# Patient Record
Sex: FEMALE | Race: WHITE
[De-identification: names, ages, dates, MRNs, and addresses within clinical notes are randomized per-mention and may not be internally consistent; named-entity substitution may affect disease eponyms.]

---

## 2018-06-29 ENCOUNTER — HOSPITAL ENCOUNTER (INPATIENT)
Dept: HOSPITAL 96 - M.SUR | Age: 73
LOS: 2 days | Discharge: HOME | DRG: 563 | End: 2018-07-01
Attending: INTERNAL MEDICINE | Admitting: INTERNAL MEDICINE
Payer: COMMERCIAL

## 2018-06-29 VITALS — SYSTOLIC BLOOD PRESSURE: 139 MMHG | DIASTOLIC BLOOD PRESSURE: 70 MMHG

## 2018-06-29 VITALS — BODY MASS INDEX: 34.07 KG/M2 | HEIGHT: 59.02 IN | WEIGHT: 169 LBS

## 2018-06-29 VITALS — SYSTOLIC BLOOD PRESSURE: 111 MMHG | DIASTOLIC BLOOD PRESSURE: 60 MMHG

## 2018-06-29 VITALS — DIASTOLIC BLOOD PRESSURE: 64 MMHG | SYSTOLIC BLOOD PRESSURE: 152 MMHG
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
## 2018-06-29 DIAGNOSIS — Z79.899: ICD-10-CM

## 2018-06-29 DIAGNOSIS — K21.9: ICD-10-CM
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
## 2018-06-29 DIAGNOSIS — I10: ICD-10-CM
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
## 2018-06-29 DIAGNOSIS — S42.252A: Primary | ICD-10-CM
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
## 2018-06-29 DIAGNOSIS — E66.01: ICD-10-CM

## 2018-06-29 DIAGNOSIS — F41.9: ICD-10-CM

## 2018-06-29 LAB
ABSOLUTE BASOPHILS: 0.1 THOU/UL (ref 0–0.2)
ABSOLUTE EOSINOPHILS: 0 THOU/UL (ref 0–0.7)
ABSOLUTE MONOCYTES: 0.4 THOU/UL (ref 0–1.2)
ALBUMIN SERPL-MCNC: 3.8 G/DL (ref 3.4–5)
ALP SERPL-CCNC: 56 U/L (ref 46–116)
ALT SERPL-CCNC: 23 U/L (ref 30–65)
ANION GAP SERPL CALC-SCNC: 6 MMOL/L (ref 7–16)
APTT BLD: 25.5 SECONDS (ref 25–31.3)
AST SERPL-CCNC: 19 U/L (ref 15–37)
BASOPHILS NFR BLD AUTO: 0.9 %
BILIRUB SERPL-MCNC: 0.4 MG/DL
BUN SERPL-MCNC: 24 MG/DL (ref 7–18)
CALCIUM SERPL-MCNC: 9.4 MG/DL (ref 8.5–10.1)
CHLORIDE SERPL-SCNC: 103 MMOL/L (ref 98–107)
CO2 SERPL-SCNC: 30 MMOL/L (ref 21–32)
CREAT SERPL-MCNC: 0.8 MG/DL (ref 0.6–1.3)
EOSINOPHIL NFR BLD: 0.1 %
GLUCOSE SERPL-MCNC: 143 MG/DL (ref 70–99)
GRANULOCYTES NFR BLD MANUAL: 68.4 %
HCT VFR BLD CALC: 40.3 % (ref 37–47)
HGB BLD-MCNC: 13.5 GM/DL (ref 12–15)
INR PPP: 1.1
LYMPHOCYTES # BLD: 1.9 THOU/UL (ref 0.8–5.3)
LYMPHOCYTES NFR BLD AUTO: 25.6 %
MCH RBC QN AUTO: 31.7 PG (ref 26–34)
MCHC RBC AUTO-ENTMCNC: 33.4 G/DL (ref 28–37)
MCV RBC: 95.1 FL (ref 80–100)
MONOCYTES NFR BLD: 5 %
MPV: 7.7 FL. (ref 7.2–11.1)
NEUTROPHILS # BLD: 5 THOU/UL (ref 1.6–8.1)
NUCLEATED RBCS: 0 /100WBC
PLATELET COUNT*: 193 THOU/UL (ref 150–400)
POTASSIUM SERPL-SCNC: 3.9 MMOL/L (ref 3.5–5.1)
PROT SERPL-MCNC: 7.4 G/DL (ref 6.4–8.2)
PROTHROMBIN TIME: 10.7 SECONDS (ref 9.2–11.5)
RBC # BLD AUTO: 4.24 MIL/UL (ref 4.2–5)
RDW-CV: 14.2 % (ref 10.5–14.5)
SODIUM SERPL-SCNC: 139 MMOL/L (ref 136–145)
WBC # BLD AUTO: 7.3 THOU/UL (ref 4–11)

## 2018-06-29 NOTE — NUR
PATIENT ARRIVED TO  UNIT AT 2114 FORM PACU. ALERT AND ORIENTED X 4. VITALS
STABLE. LEFT ARM IMMOBILIZER IN PLACE. PAIN CONTROLLED. ORIENTED TO ROOM AND
STAFF. EDUCATED ABOUT FALL PREVENTION. CALL LIGHT IN REACH. INSTRUCTED TO CALL
FOR ASSISTANCE.

## 2018-06-30 VITALS — DIASTOLIC BLOOD PRESSURE: 48 MMHG | SYSTOLIC BLOOD PRESSURE: 125 MMHG

## 2018-06-30 VITALS — DIASTOLIC BLOOD PRESSURE: 54 MMHG | SYSTOLIC BLOOD PRESSURE: 134 MMHG

## 2018-06-30 VITALS — SYSTOLIC BLOOD PRESSURE: 136 MMHG | DIASTOLIC BLOOD PRESSURE: 61 MMHG

## 2018-06-30 VITALS — DIASTOLIC BLOOD PRESSURE: 62 MMHG | SYSTOLIC BLOOD PRESSURE: 130 MMHG

## 2018-06-30 VITALS — SYSTOLIC BLOOD PRESSURE: 155 MMHG | DIASTOLIC BLOOD PRESSURE: 66 MMHG

## 2018-06-30 PROCEDURE — 0PSDXZZ REPOSITION LEFT HUMERAL HEAD, EXTERNAL APPROACH: ICD-10-PCS | Performed by: ORTHOPAEDIC SURGERY

## 2018-06-30 NOTE — NUR
ASSUMED CARE OF PATIENT AFTER MORNING REPORT. ALERT AND ORIENTED X4.
ASSESSMENT COMPLETED AND AS CHARTED. VSS ON 3 LITERS 02, TITRATED DOWN TO ROOM
AIR. PATIENTS PAIN HAS BEEN MINIMAL AND MANAGED WITH SCHEDULED PAIN
MEDICATION. PT AND OT ORDEED FOR PATIENT. RESTING COMFORTABLY IN BED AT THIS
TIME. HOURLY ROUNDS MAINTAINED, CALL LIGHT IN REACH, NURSING WILL CONTINUE TO
MONITOR.

## 2018-06-30 NOTE — NUR
Pt is A&O.  in room at bedside, but is asleep. Pt normally resides at
home with her  and is independent with ADLs. No DME. Hx of HH after a
broken hip, Pt believes she used [facility]. Hx of skilled at [facility].
Supportive family that is involved in POC. Pt[place],
Pt wants HH and would like to use [facility] at [place] p:[phone],
f:[phone]. At [place], HH orders for [facility], PT/OT will need to be faxed, along
with facesheet, H&P and op report to the above number.

## 2018-06-30 NOTE — NUR
PATIENT REMAINS ALERT AND ORIENTED. PAIN CONTROLLED WITH PO MEDICATION. UP
SBA TO BSC, VOIDING ADEQUATELY. DENIES NAUSEA. LEFT SHOULDER IMMOBILIZER IN
PLACE. PROGRESSING WELL TOWARD DISCHARGE GOALS. HOURLY ROUNDS. BED ALARM IN
USE. NURSING WILL CONTINUE TO MONITOR.

## 2018-07-01 VITALS — SYSTOLIC BLOOD PRESSURE: 147 MMHG | DIASTOLIC BLOOD PRESSURE: 64 MMHG

## 2018-07-01 VITALS — SYSTOLIC BLOOD PRESSURE: 156 MMHG | DIASTOLIC BLOOD PRESSURE: 66 MMHG

## 2018-07-01 NOTE — EKG
[place] [address]
Phone:  [phone]                     ELECTROCARDIOGRAM REPORT      
_______________________________________________________________________________
 
Name:       HARRY PHILIPPE                 Room:           [address] [place]  
.R.#:  [id number]      Account #:      [id number]  
Admission:  18     Attend Phys:    KAR Farrell
Discharge:               Date of Birth:  [birth year]  
         Report #: [id number]
    [id number]
_______________________________________________________________________________
THIS REPORT FOR:  //name//                      
 
                         [facility] ED
                                       
Test Date:    2018               Test Time:    14:11:19
Pat Name:     HARRY PHILIPPE             Department:   
Patient ID:   [id number]            Room:          
Gender:       F                        Technician:   KATERINA CHOPRA
:          1945               Requested By: Ellie Choudhury
Order Number: [id number] MD:   Enmanuel Aparicio
                                 Measurements
Intervals                              Axis          
Rate:         55                       P:            48
WY:           129                      QRS:          -34
QRSD:         103                      T:            7
QT:           420                                    
QTc:          402                                    
                           Interpretive Statements
Sinus rhythm
Left axis deviation
No previous ECG available for comparison
 
Electronically Signed On 2018 14:37:08 CDT by Enmanuel Aparicio
[email or web address]
 
 
 
 
 
 
 
 
 
 
 
 
 
 
 
 
 
 
 
  <ELECTRONICALLY SIGNED>
                                           By: Enmanuel Aparicio MD, [facility]   
  18     1437
D: 18 1411   _____________________________________
T: 18 141   Enmanuel Aparicio MD, FACC     /EPI

## 2018-07-01 NOTE — NUR
ASSUMED CARE OF PATIENT AFTER MORNING REPORT. ALERT AND ORIENTED X4. ASSESSMNT
COMPLETED AND AS CHARTED. VSS ON ROOM AIR. PATIENT PAIN HAS BEEN MANAGED WITH
PAIN MEDICATION. NO COMPLAINTS OF NAUSEA OR SOA THIS SHIFT. PT EVALUATED
PATIENT AND CLEARED FOR DISCHARGE HOME WITHOUT HOME HEALTH. PATIENT DISCHARGED
AT 1550. ALL PERSONAL BELONGINGS, PRESCRIPTIONS, AND DISCHARGE INFORMATION
SENT WITH PATIENT UPON DISCHARGE.

## 2018-07-01 NOTE — NUR
PATIENT HAS SLEPT THROUGHOUT THE NIGHT, RESTLESS AT TIMES. PAIN CONTROLLED
WITH ORAL PAIN MEDICATIONS AND CHARTED. PATIENT IS UP WITH ASSIST X 1 TO THE
BATHROOM. IMMOBILIZER TO LEFT SHOULDER IN PLACE. PATIENT HAS REMAINED NWB TO
LEFT ARM. IV IN RIGHT WRIST-SL. VSS ON 2L 02 VIA NASAL CANNULA. PATIENT
INSTRUCTED TO USE CALL LIGHT WHEN NEEDING ASSISTANCE. HOURLY ROUNDS MADE. WILL
CONTINUE WITH PLAN OF CARE AND NURSING TO MONITOR.

## 2018-09-04 NOTE — OP
[address]  [address]                    OPERATIVE REPORT              
_______________________________________________________________________________
 
Name:       HARRY PHILIPPE                 Room:           [address] [place]  
M.R.#:  [id number]      Account #:      [id number]  
Admission:  06/29/18     Attend Phys:    KAR Farrell
Discharge:  07/01/18     Date of Birth:  [birth year]  
         Report #: [id number]
                                                                     [id number]  
_______________________________________________________________________________
THIS REPORT FOR:  //name//                      
 
CC: Fede Lee
 
PREOPERATIVE DIAGNOSIS:  Fracture dislocation of the proximal humerus
irreducible in the Emergency Department.
 
POSTOPERATIVE DIAGNOSIS:  Fracture dislocation of the proximal humerus
irreducible in the Emergency Department.
 
PROCEDURE:  Closed reduction and immobilization of fracture dislocation of the
left proximal humerus.
 
SURGEON:  Inder Moulton DO
 
ASSISTANTS:
1.  Christian Bejarano DO
2.  William Hobson DO
 
ANESTHESIA:  General.
 
ANTIBIOTICS:  None.
 
SPECIMENS:  None.
 
DRAINS:  None.
 
CONDITION:  The patient is stable to PACU.
 
IMPLANTS:  None.
 
INDICATION FOR PROCEDURE:  The patient is a 72-year-old female who sustained a
fracture dislocation of her proximal humerus.  Attempts were made in the ED for
reduction, they were unsuccessful.  CT scan showed fracture of the humeral head
as well as the greater tuberosity and likely lesser tuberosity.  I had a long
conversation with her as well as her  in regards to the risks and
complications associated with surgery.  They acknowledged and accepted those
risks and they did wish to proceed.
 
DESCRIPTION OF PROCEDURE:  I asked what extremity was correct, she said the
left.  I marked that extremity.  All team members agreed.  She was taken back to
the operative suite where a timeout was performed indicating correct patient,
procedure, site, antibiotics that were not given and then implants needle were
present and sterile.  All team members agreed.  General anesthetic was
administered.  Once she was fully paralyzed, we performed a closed reduction
 
 
 
[address]  [address]                    OPERATIVE REPORT              
_______________________________________________________________________________
 
Name:       HARRY PHILIPPE                 Room:           [address] [place]  
[facility].#:  [id number]      Account #:      [id number]  
Admission:  06/29/18     Attend Phys:    KAR Farrell
Discharge:  07/01/18     Date of Birth:  [birth year]  
         Report #: [id number]
                                                                     [id number]  
_______________________________________________________________________________
with C-arm, which showed that we had performed the reduction, brought in a flat
plate showing that there is scapular Y and AP showed the glenohumeral joint to
be reduced.  She was placed in immobilizer, successfully extubated and
transferred off the operating table and taken to PACU in stable condition.
 
POSTOPERATIVE COURSE AND EVALUATION:  I had a long conversation with her family.
 Discussed with them that we were able to get a good reduction stable.  At 72
years old, I do not feel it is appropriate to perform open reduction and
internal fixation of this fracture.  She does have a significant fracture of the
head itself.  I feel that the risks of this going on to nonunion or avascular
necrosis is quite high and I feel at her age, she would benefit more from an
arthroplasty type situation.  They understood this.  Thanked me very much for
our time and efforts and I made sure they have access to my number as well as
the office number and encouraged them to call at any time with questions or
concerns.  When I went to examine her, she was resting in the PACU, doing well. 
Sling was in the immobilizer and she was fully neurologically intact distally. 
She can feel me touching of her deltoid.  Compartments soft and compressible.
 
 
 
 
 
 
 
 
 
 
 
 
 
 
 
 
 
 
 
 
 
 
 
 
 
 
 
<ELECTRONICALLY SIGNED>
                                        By:  Inder Moulton DO             
09/04/18     1041
D: 06/30/18 2243_______________________________________
T: 06/30/18 2329Inder Moulton DO                /nt